# Patient Record
Sex: MALE | Race: WHITE
[De-identification: names, ages, dates, MRNs, and addresses within clinical notes are randomized per-mention and may not be internally consistent; named-entity substitution may affect disease eponyms.]

---

## 2020-10-14 NOTE — NUR
Patient discharged to home in stable condition.  Written and verbal after care 
instructions given. 

Patient verbalizes understanding of instructions. Stressed follow up or return 
to ER for worsening s/s.pt walks in steady gait.

## 2020-10-24 NOTE — NUR
Patient discharged to home in stable condition.  Written and verbal after care 
instructions given. 

Patient verbalizes understanding of instructions. Stressed follow up or return 
to ER for worsening s/s.

Patient ambulated out of ER with steady gait, no acute signs of distress, VSS, 
all belongings taken.

## 2021-05-01 NOTE — NUR
Patient eloped from facility.  ER physician notified. Last seen 1920. Patient 
was awaiting xray results and discharge instructions.

## 2021-05-10 NOTE — NUR
A/Bx ointment applied, finger dressed and bandaged.  Pt given d/c instructions, 
verbalized understanding.

## 2021-05-10 NOTE — NUR
cleaned wound with surgical scrub brush.  Asst. BARAJAS w/suture removal, small 
patch of dead skin atop wound.

## 2021-05-12 NOTE — NUR
Patient was seen by MD.   Abx ointment and clean dressing applied to finger. 

DC and follow up instruction given and explained to patient who states he 
understands all instructions

## 2023-01-14 ENCOUNTER — HOSPITAL ENCOUNTER (EMERGENCY)
Dept: HOSPITAL 12 - ER | Age: 48
Discharge: HOME | End: 2023-01-14
Payer: COMMERCIAL

## 2023-01-14 VITALS — WEIGHT: 155 LBS | BODY MASS INDEX: 22.19 KG/M2 | HEIGHT: 70 IN

## 2023-01-14 DIAGNOSIS — F17.200: ICD-10-CM
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
## 2023-01-14 DIAGNOSIS — Z86.14: ICD-10-CM

## 2023-01-14 DIAGNOSIS — Z90.81: ICD-10-CM

## 2023-01-14 DIAGNOSIS — D69.3: ICD-10-CM

## 2023-01-14 DIAGNOSIS — S09.90XA: ICD-10-CM

## 2023-01-14 DIAGNOSIS — S01.81XA: Primary | ICD-10-CM
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
## 2023-01-14 PROCEDURE — A4663 DIALYSIS BLOOD PRESSURE CUFF: HCPCS

## 2023-01-14 NOTE — NUR
Pt discharged to home in stable condition.  Written and verbal after care 
instructions given. 

Pt verbalizes understanding of instructions. Stressed follow up or return to ER 
for worsening s/s.

## 2023-01-14 NOTE — NUR
Pt came in with c/o laceration on the glabella, around 2cm d/t accidental 
bumping of his head. Denies blurry vision, headache, dizziness, n/v. Denies 
pain. Cleaned the wound. 



Seen by Dr. Layton for MSE.